# Patient Record
Sex: MALE | Race: WHITE | HISPANIC OR LATINO | URBAN - NONMETROPOLITAN AREA
[De-identification: names, ages, dates, MRNs, and addresses within clinical notes are randomized per-mention and may not be internally consistent; named-entity substitution may affect disease eponyms.]

---

## 2024-09-26 ENCOUNTER — OFFICE VISIT (OUTPATIENT)
Dept: SURGERY | Facility: CLINIC | Age: 34
End: 2024-09-26
Payer: COMMERCIAL

## 2024-09-26 VITALS
OXYGEN SATURATION: 98 % | HEIGHT: 67 IN | WEIGHT: 150 LBS | TEMPERATURE: 97.7 F | HEART RATE: 93 BPM | BODY MASS INDEX: 23.54 KG/M2

## 2024-09-26 DIAGNOSIS — R04.0 FREQUENT NOSEBLEEDS: ICD-10-CM

## 2024-09-26 DIAGNOSIS — K61.1 PERIRECTAL ABSCESS: Primary | ICD-10-CM

## 2024-09-30 NOTE — PROGRESS NOTES
"Patient: Marvin Roland    YOB: 1990    Date: 10/03/2024    Primary Care Provider: Provider, No Known    Chief Complaint   Patient presents with    Post-op Follow-up     I&D       History of present illness:  I saw the patient in the office today as a followup from their recent perirectal abscess .  No complaints.  No pain.  Doing well.    Vital Signs:   Vitals:    10/03/24 1418   Pulse: 105   Temp: 98.2 °F (36.8 °C)   SpO2: 98%   Weight: 68 kg (150 lb)   Height: 170.2 cm (67.01\")       Physical Exam:    Drainage site examined.  Almost closed.  No evidence of ongoing infection.  Nontender.     Assessment / Plan :    1. Postoperative visit      Doing well after perirectal/perineal abscess drainage.  I explained to him that this may recur.  Fistula may also develop.  If he has any future issues he should follow-up with me.    Electronically signed by Dennis Sanchez MD  10/03/24                "

## 2024-10-01 LAB
BACTERIA SPEC AEROBE CULT: ABNORMAL
BACTERIA SPEC ANAEROBE CULT: ABNORMAL
BACTERIA SPEC CULT: ABNORMAL
BACTERIA SPEC CULT: ABNORMAL

## 2024-10-02 ENCOUNTER — TELEPHONE (OUTPATIENT)
Dept: SURGERY | Facility: CLINIC | Age: 34
End: 2024-10-02
Payer: COMMERCIAL

## 2024-10-02 NOTE — TELEPHONE ENCOUNTER
----- Message from Dennis Sanchez sent at 10/1/2024  5:01 PM EDT -----  I spoke to the patient regarding the culture.  I explained to him that the Augmentin probably will not treat the Bacteroides.  He states that he is having no pain and no drainage and feeling very well.  I elected not to add any antibiotic and I will see him on Thursday.  He will call us sooner if he is having any issues.  ----- Message -----  From: [facility] Results In  Sent: 9/30/2024   8:09 PM EDT  To: Dennis Sanchez MD

## 2024-10-03 ENCOUNTER — OFFICE VISIT (OUTPATIENT)
Dept: SURGERY | Facility: CLINIC | Age: 34
End: 2024-10-03
Payer: COMMERCIAL

## 2024-10-03 VITALS
WEIGHT: 150 LBS | HEART RATE: 105 BPM | BODY MASS INDEX: 23.54 KG/M2 | HEIGHT: 67 IN | TEMPERATURE: 98.2 F | OXYGEN SATURATION: 98 %

## 2024-10-03 DIAGNOSIS — Z48.89 POSTOPERATIVE VISIT: Primary | ICD-10-CM

## 2024-10-03 PROCEDURE — 99024 POSTOP FOLLOW-UP VISIT: CPT | Performed by: SURGERY
